# Patient Record
Sex: FEMALE | Race: WHITE | NOT HISPANIC OR LATINO
[De-identification: names, ages, dates, MRNs, and addresses within clinical notes are randomized per-mention and may not be internally consistent; named-entity substitution may affect disease eponyms.]

---

## 2017-01-30 ENCOUNTER — OTHER (OUTPATIENT)
Age: 57
End: 2017-01-30

## 2019-11-12 ENCOUNTER — RESULT REVIEW (OUTPATIENT)
Age: 59
End: 2019-11-12

## 2022-10-07 ENCOUNTER — RESULT CHARGE (OUTPATIENT)
Age: 62
End: 2022-10-07

## 2022-10-07 ENCOUNTER — APPOINTMENT (OUTPATIENT)
Dept: OBGYN | Facility: CLINIC | Age: 62
End: 2022-10-07

## 2022-10-07 VITALS — BODY MASS INDEX: 25.06 KG/M2 | DIASTOLIC BLOOD PRESSURE: 80 MMHG | WEIGHT: 137 LBS | SYSTOLIC BLOOD PRESSURE: 110 MMHG

## 2022-10-07 VITALS — HEIGHT: 61 IN

## 2022-10-07 DIAGNOSIS — Z00.00 ENCOUNTER FOR GENERAL ADULT MEDICAL EXAMINATION W/OUT ABNORMAL FINDINGS: ICD-10-CM

## 2022-10-07 DIAGNOSIS — N95.2 POSTMENOPAUSAL ATROPHIC VAGINITIS: ICD-10-CM

## 2022-10-07 DIAGNOSIS — R92.2 INCONCLUSIVE MAMMOGRAM: ICD-10-CM

## 2022-10-07 LAB
BILIRUB UR QL STRIP: NORMAL
CLARITY UR: CLEAR
COLLECTION METHOD: NORMAL
GLUCOSE UR-MCNC: NORMAL
HCG UR QL: 1 EU/DL
HEMOGLOBIN: 12.4
HGB UR QL STRIP.AUTO: NORMAL
KETONES UR-MCNC: NORMAL
LEUKOCYTE ESTERASE UR QL STRIP: NORMAL
NITRITE UR QL STRIP: NORMAL
PH UR STRIP: 6
PROT UR STRIP-MCNC: NORMAL
SP GR UR STRIP: 1.03

## 2022-10-07 PROCEDURE — 81003 URINALYSIS AUTO W/O SCOPE: CPT | Mod: QW

## 2022-10-07 PROCEDURE — 85018 HEMOGLOBIN: CPT | Mod: QW

## 2022-10-07 PROCEDURE — 99396 PREV VISIT EST AGE 40-64: CPT | Mod: 25

## 2022-10-07 PROCEDURE — 82270 OCCULT BLOOD FECES: CPT

## 2022-10-07 RX ORDER — ESTRADIOL 0.1 MG/G
0.1 CREAM VAGINAL
Qty: 1 | Refills: 6 | Status: ACTIVE | COMMUNITY
Start: 2022-10-07 | End: 1900-01-01

## 2022-10-10 NOTE — PHYSICAL EXAM
[Chaperone Present] : A chaperone was present in the examining room during all aspects of the physical examination [Appropriately responsive] : appropriately responsive [No Lymphadenopathy] : no lymphadenopathy [Alert] : alert [Soft] : soft [Non-tender] : non-tender [Oriented x3] : oriented x3 [Examination Of The Breasts] : a normal appearance [No Discharge] : no discharge [No Masses] : no breast masses were palpable [Labia Majora] : normal [Labia Minora] : normal [Atrophy] : atrophy [Normal] : normal [Uterine Adnexae] : normal [FreeTextEntry1] : Justine TRUONG-AMANDA chaperoned during entire physical exam [Occult Blood Positive] : was negative for occult blood analysis

## 2022-10-10 NOTE — HISTORY OF PRESENT ILLNESS
[Difficulty with Prem[place]] : difficulty with intercourse [Yes] : yes [Vaginal Lubrication] : vaginal lubrication [TextBox_4] : Patient is a 61y/o female here today for annual visit. She is UTD on colonoscopy, she is followed by Dr. Valverde. \par Mammogram and bilateral breast US was done yesterday pending results.\par She does complain of vaginal dryness during intercourse due to menopause and vaginal atrophy

## 2022-10-10 NOTE — PLAN
[FreeTextEntry1] : Patient to follow up in 1 year for annual GYN exam\par Mammogram and bilateral breast US due: 10/2023 Rx given \par Colonoscopy due: per MD alva 6months \par Bone density due: 10/23 Rx given\par \par Will start vaginal estrogen cream for vaginal atrophy, and difficulty with intercourse. agreeable with plan\par Pap ordered\par Hemoccult ordered \par All questions answered, patient agreeable with plan.\par \par \par Written by Justine DRAPER, acting as a scribe for Dr. Garland. This note accurately reflects the work and decisions made by me.\par

## 2022-10-13 LAB — CYTOLOGY CVX/VAG DOC THIN PREP: NORMAL

## 2023-10-20 ENCOUNTER — NON-APPOINTMENT (OUTPATIENT)
Age: 63
End: 2023-10-20